# Patient Record
Sex: FEMALE | URBAN - METROPOLITAN AREA
[De-identification: names, ages, dates, MRNs, and addresses within clinical notes are randomized per-mention and may not be internally consistent; named-entity substitution may affect disease eponyms.]

---

## 2021-10-01 ENCOUNTER — APPOINTMENT (RX ONLY)
Dept: URBAN - METROPOLITAN AREA OTHER 13 | Facility: OTHER | Age: 76
Setting detail: DERMATOLOGY
End: 2021-10-01

## 2021-10-01 DIAGNOSIS — L21.8 OTHER SEBORRHEIC DERMATITIS: ICD-10-CM

## 2021-10-01 PROCEDURE — ? PRESCRIPTION MEDICATION MANAGEMENT

## 2021-10-01 PROCEDURE — ? PRESCRIPTION

## 2021-10-01 PROCEDURE — 99203 OFFICE O/P NEW LOW 30 MIN: CPT

## 2021-10-01 PROCEDURE — ? COUNSELING

## 2021-10-01 RX ORDER — KETOCONAZOLE 10 MG/ML
SHAMPOO TOPICAL
Qty: 200 | Refills: 2 | Status: ERX | COMMUNITY
Start: 2021-10-01

## 2021-10-01 RX ORDER — FLUOCINOLONE ACETONIDE 0.1 MG/ML
SOLUTION TOPICAL
Qty: 60 | Refills: 1 | Status: ERX | COMMUNITY
Start: 2021-10-01

## 2021-10-01 RX ORDER — HYDROCORTISONE 25 MG/G
CREAM TOPICAL
Qty: 30 | Refills: 2 | Status: ERX | COMMUNITY
Start: 2021-10-01

## 2021-10-01 RX ADMIN — FLUOCINOLONE ACETONIDE: 0.1 SOLUTION TOPICAL at 00:00

## 2021-10-01 RX ADMIN — HYDROCORTISONE: 25 CREAM TOPICAL at 00:00

## 2021-10-01 RX ADMIN — KETOCONAZOLE: 10 SHAMPOO TOPICAL at 00:00

## 2021-10-01 ASSESSMENT — LOCATION DETAILED DESCRIPTION DERM
LOCATION DETAILED: MEDIAL FRONTAL SCALP
LOCATION DETAILED: RIGHT SUPERIOR PARIETAL SCALP
LOCATION DETAILED: RIGHT INFERIOR LATERAL FOREHEAD
LOCATION DETAILED: LEFT INFERIOR LATERAL FOREHEAD

## 2021-10-01 ASSESSMENT — LOCATION ZONE DERM
LOCATION ZONE: SCALP
LOCATION ZONE: FACE

## 2021-10-01 ASSESSMENT — LOCATION SIMPLE DESCRIPTION DERM
LOCATION SIMPLE: RIGHT FOREHEAD
LOCATION SIMPLE: FRONTAL SCALP
LOCATION SIMPLE: LEFT FOREHEAD
LOCATION SIMPLE: SCALP

## 2021-10-01 NOTE — PROCEDURE: PRESCRIPTION MEDICATION MANAGEMENT
Render In Strict Bullet Format?: No
Continue Regimen: Using ketoconazole or clobetasol
Initiate Treatment: Nizoral shampoo for scalp \\nSynalar solution for scalp and eyebrows \\nHydrocortisone for eyelid rash
Detail Level: Zone

## 2022-03-18 ENCOUNTER — RX ONLY (OUTPATIENT)
Age: 77
Setting detail: RX ONLY
End: 2022-03-18

## 2022-03-18 ENCOUNTER — APPOINTMENT (RX ONLY)
Dept: URBAN - METROPOLITAN AREA OTHER 13 | Facility: OTHER | Age: 77
Setting detail: DERMATOLOGY
End: 2022-03-18

## 2022-03-18 DIAGNOSIS — L21.8 OTHER SEBORRHEIC DERMATITIS: ICD-10-CM | Status: INADEQUATELY CONTROLLED

## 2022-03-18 PROCEDURE — 99213 OFFICE O/P EST LOW 20 MIN: CPT

## 2022-03-18 PROCEDURE — ? TREATMENT REGIMEN

## 2022-03-18 PROCEDURE — ? COUNSELING

## 2022-03-18 RX ORDER — KETOCONAZOLE 10 MG/ML
SHAMPOO TOPICAL
Qty: 200 | Refills: 2 | Status: ERX

## 2022-03-18 RX ORDER — HYDROCORTISONE 25 MG/G
CREAM TOPICAL
Qty: 30 | Refills: 2 | Status: ERX

## 2022-03-18 ASSESSMENT — LOCATION DETAILED DESCRIPTION DERM
LOCATION DETAILED: LEFT INFERIOR MEDIAL FOREHEAD
LOCATION DETAILED: LEFT INFERIOR MEDIAL MALAR CHEEK
LOCATION DETAILED: RIGHT INFERIOR MEDIAL MALAR CHEEK

## 2022-03-18 ASSESSMENT — LOCATION SIMPLE DESCRIPTION DERM
LOCATION SIMPLE: RIGHT CHEEK
LOCATION SIMPLE: LEFT FOREHEAD
LOCATION SIMPLE: LEFT CHEEK

## 2022-03-18 ASSESSMENT — SEVERITY ASSESSMENT: HOW SEVERE IS THIS PATIENT'S CONDITION?: MODERATE

## 2022-03-18 ASSESSMENT — LOCATION ZONE DERM: LOCATION ZONE: FACE

## 2022-03-18 NOTE — PROCEDURE: TREATMENT REGIMEN
Detail Level: Zone
Plan: A brochure in [facility] was given and written instructions were given on how to use medication
Discontinue Regimen: Altreno on face until rash is better and then start back using it only 2 nights a week
Continue Regimen: Nizoral shampoo on scalp\\n\\nSynalar solution on scalp after showering
Initiate Treatment: Nizoral shampoo- wash face in the shower \\n\\nHydrocortisone cream- 2-3 times a day for a couple of weeks, prn flares

## 2023-12-06 ENCOUNTER — APPOINTMENT (RX ONLY)
Dept: URBAN - METROPOLITAN AREA OTHER 13 | Facility: OTHER | Age: 78
Setting detail: DERMATOLOGY
End: 2023-12-06

## 2023-12-06 DIAGNOSIS — L82.1 OTHER SEBORRHEIC KERATOSIS: ICD-10-CM

## 2023-12-06 DIAGNOSIS — B07.8 OTHER VIRAL WARTS: ICD-10-CM

## 2023-12-06 DIAGNOSIS — D22 MELANOCYTIC NEVI: ICD-10-CM

## 2023-12-06 DIAGNOSIS — L82.0 INFLAMED SEBORRHEIC KERATOSIS: ICD-10-CM

## 2023-12-06 DIAGNOSIS — Z85.828 PERSONAL HISTORY OF OTHER MALIGNANT NEOPLASM OF SKIN: ICD-10-CM

## 2023-12-06 DIAGNOSIS — D485 NEOPLASM OF UNCERTAIN BEHAVIOR OF SKIN: ICD-10-CM

## 2023-12-06 DIAGNOSIS — L21.8 OTHER SEBORRHEIC DERMATITIS: ICD-10-CM

## 2023-12-06 DIAGNOSIS — L73.8 OTHER SPECIFIED FOLLICULAR DISORDERS: ICD-10-CM

## 2023-12-06 PROBLEM — D48.5 NEOPLASM OF UNCERTAIN BEHAVIOR OF SKIN: Status: ACTIVE | Noted: 2023-12-06

## 2023-12-06 PROBLEM — D22.9 MELANOCYTIC NEVI, UNSPECIFIED: Status: ACTIVE | Noted: 2023-12-06

## 2023-12-06 PROCEDURE — ? PRESCRIPTION

## 2023-12-06 PROCEDURE — ? COUNSELING

## 2023-12-06 PROCEDURE — ? LIQUID NITROGEN

## 2023-12-06 PROCEDURE — ? TREATMENT REGIMEN

## 2023-12-06 PROCEDURE — 11102 TANGNTL BX SKIN SINGLE LES: CPT | Mod: 59

## 2023-12-06 PROCEDURE — 17110 DESTRUCTION B9 LES UP TO 14: CPT

## 2023-12-06 PROCEDURE — 99213 OFFICE O/P EST LOW 20 MIN: CPT | Mod: 25

## 2023-12-06 PROCEDURE — ? BIOPSY BY SHAVE METHOD

## 2023-12-06 PROCEDURE — ? PRESCRIPTION MEDICATION MANAGEMENT

## 2023-12-06 RX ORDER — HYDROCORTISONE 25 MG/ML
LOTION TOPICAL
Qty: 118 | Refills: 3 | Status: ERX | COMMUNITY
Start: 2023-12-06

## 2023-12-06 RX ORDER — KETOCONAZOLE 20 MG/ML
SHAMPOO, SUSPENSION TOPICAL
Qty: 120 | Refills: 3 | Status: ERX | COMMUNITY
Start: 2023-12-06

## 2023-12-06 RX ADMIN — KETOCONAZOLE: 20 SHAMPOO, SUSPENSION TOPICAL at 00:00

## 2023-12-06 RX ADMIN — HYDROCORTISONE: 25 LOTION TOPICAL at 00:00

## 2023-12-06 ASSESSMENT — LOCATION DETAILED DESCRIPTION DERM
LOCATION DETAILED: LEFT INFERIOR MEDIAL FOREHEAD
LOCATION DETAILED: RIGHT MEDIAL SUPERIOR CHEST
LOCATION DETAILED: RIGHT SUPERIOR MEDIAL UPPER BACK
LOCATION DETAILED: LEFT SUPERIOR MEDIAL UPPER BACK
LOCATION DETAILED: RIGHT PROXIMAL POSTERIOR UPPER ARM
LOCATION DETAILED: LEFT INFERIOR MEDIAL MALAR CHEEK
LOCATION DETAILED: RIGHT MEDIAL UPPER BACK
LOCATION DETAILED: LEFT CLAVICULAR SKIN
LOCATION DETAILED: RIGHT CENTRAL MALAR CHEEK
LOCATION DETAILED: LEFT CENTRAL MALAR CHEEK
LOCATION DETAILED: PERIUMBILICAL SKIN
LOCATION DETAILED: RIGHT INFERIOR MEDIAL MALAR CHEEK

## 2023-12-06 ASSESSMENT — LOCATION SIMPLE DESCRIPTION DERM
LOCATION SIMPLE: CHEST
LOCATION SIMPLE: RIGHT CHEEK
LOCATION SIMPLE: RIGHT POSTERIOR UPPER ARM
LOCATION SIMPLE: ABDOMEN
LOCATION SIMPLE: LEFT FOREHEAD
LOCATION SIMPLE: LEFT UPPER BACK
LOCATION SIMPLE: LEFT CHEEK
LOCATION SIMPLE: RIGHT UPPER BACK
LOCATION SIMPLE: LEFT CLAVICULAR SKIN

## 2023-12-06 ASSESSMENT — LOCATION ZONE DERM
LOCATION ZONE: TRUNK
LOCATION ZONE: FACE
LOCATION ZONE: ARM

## 2023-12-06 NOTE — PROCEDURE: PRESCRIPTION MEDICATION MANAGEMENT
Continue Regimen: ketoconazole 2 % shampoo \\nQuantity: 120.0 ml  Days Supply: 30\\nSig: Apply directly to scalp, leaving on a few minutes (5 minutes)prior to rinsing 3 times a week. Use 3-4 weeks then prn for flares.\\n\\nhydrocortisone 2.5 % lotion \\nQuantity: 118.0 ml  Days Supply: 30\\nSig: Apply to effected area bid x 2 weeks then stop for two weeks. May repeat prn
Detail Level: Zone
Render In Strict Bullet Format?: No

## 2023-12-06 NOTE — PROCEDURE: LIQUID NITROGEN
Render Note In Bullet Format When Appropriate: No
Show Aperture Variable?: Yes
Medical Necessity Clause: This procedure was medically necessary because the lesions that were treated were:
Spray Paint Text: The liquid nitrogen was applied to the skin utilizing a spray paint frosting technique.
Post-Care Instructions: I reviewed with the patient in detail post-care instructions. Patient is to wear sunprotection, and avoid picking at any of the treated lesions. Pt may apply Vaseline to crusted or scabbing areas.
Detail Level: Detailed
Medical Necessity Information: It is in your best interest to select a reason for this procedure from the list below. All of these items fulfill various CMS LCD requirements except the new and changing color options.
Consent: The patient's consent was obtained including but not limited to risks of crusting, scabbing, blistering, scarring, darker or lighter pigmentary change, recurrence, incomplete removal and infection.

## 2023-12-06 NOTE — PROCEDURE: MIPS QUALITY
Quality 431: Preventive Care And Screening: Unhealthy Alcohol Use - Screening: Patient identified as an unhealthy alcohol user when screened for unhealthy alcohol use using a systematic screening method and received brief counseling
Quality 110: Preventive Care And Screening: Influenza Immunization: Influenza Immunization Administered during Influenza season
Quality 47: Advance Care Plan: Advance Care Planning discussed and documented; advance care plan or surrogate decision maker documented in the medical record.
Detail Level: Detailed
Quality 226: Preventive Care And Screening: Tobacco Use: Screening And Cessation Intervention: Patient screened for tobacco use, is a smoker AND received Cessation Counseling within measurement period or in the six months prior to the measurement period
Quality 111:Pneumonia Vaccination Status For Older Adults: Patient received any pneumococcal conjugate or polysaccharide vaccine on or after their 60th birthday and before the end of the measurement period

## 2023-12-06 NOTE — PROCEDURE: BIOPSY BY SHAVE METHOD
Detail Level: Detailed
Depth Of Biopsy: dermis
Was A Bandage Applied: Yes
Size Of Lesion In Cm: 0
Biopsy Type: H and E
Biopsy Method: Dermablade
Anesthesia Type: 1% lidocaine with epinephrine
Anesthesia Volume In Cc: 0.5
Hemostasis: Drysol
Wound Care: Petrolatum
Dressing: bandage
Destruction After The Procedure: No
Type Of Destruction Used: Curettage
Curettage Text: The wound bed was treated with curettage after the biopsy was performed.
Cryotherapy Text: The wound bed was treated with cryotherapy after the biopsy was performed.
Electrodesiccation Text: The wound bed was treated with electrodesiccation after the biopsy was performed.
Electrodesiccation And Curettage Text: The wound bed was treated with electrodesiccation and curettage after the biopsy was performed.
Silver Nitrate Text: The wound bed was treated with silver nitrate after the biopsy was performed.
Lab: [id number]
Consent: Written consent was obtained and risks were reviewed including but not limited to scarring, infection, bleeding, scabbing, incomplete removal, nerve damage and allergy to anesthesia.
Post-Care Instructions: I reviewed with the patient in detail post-care instructions. Patient is to keep the biopsy site dry overnight, and then apply bacitracin twice daily until healed. Patient may apply hydrogen peroxide soaks to remove any crusting.
Notification Instructions: Patient will be notified of biopsy results. However, patient instructed to call the office if not contacted within 2 weeks.
Billing Type: Third-Party Bill
Information: Selecting Yes will display possible errors in your note based on the variables you have selected. This validation is only offered as a suggestion for you. PLEASE NOTE THAT THE VALIDATION TEXT WILL BE REMOVED WHEN YOU FINALIZE YOUR NOTE. IF YOU WANT TO FAX A PRELIMINARY NOTE YOU WILL NEED TO TOGGLE THIS TO 'NO' IF YOU DO NOT WANT IT IN YOUR FAXED NOTE.

## 2024-03-19 ENCOUNTER — APPOINTMENT (RX ONLY)
Dept: URBAN - METROPOLITAN AREA OTHER 13 | Facility: OTHER | Age: 79
Setting detail: DERMATOLOGY
End: 2024-03-19

## 2024-03-19 DIAGNOSIS — D18.0 HEMANGIOMA: ICD-10-CM

## 2024-03-19 DIAGNOSIS — L82.1 OTHER SEBORRHEIC KERATOSIS: ICD-10-CM

## 2024-03-19 DIAGNOSIS — Z87.2 PERSONAL HISTORY OF DISEASES OF THE SKIN AND SUBCUTANEOUS TISSUE: ICD-10-CM

## 2024-03-19 PROBLEM — D18.01 HEMANGIOMA OF SKIN AND SUBCUTANEOUS TISSUE: Status: ACTIVE | Noted: 2024-03-19

## 2024-03-19 PROCEDURE — ? COUNSELING

## 2024-03-19 PROCEDURE — 99213 OFFICE O/P EST LOW 20 MIN: CPT

## 2024-03-19 ASSESSMENT — LOCATION DETAILED DESCRIPTION DERM
LOCATION DETAILED: RIGHT SUPERIOR MEDIAL UPPER BACK
LOCATION DETAILED: LEFT MEDIAL UPPER BACK
LOCATION DETAILED: RIGHT SUPERIOR UPPER BACK

## 2024-03-19 ASSESSMENT — LOCATION SIMPLE DESCRIPTION DERM
LOCATION SIMPLE: LEFT UPPER BACK
LOCATION SIMPLE: RIGHT UPPER BACK

## 2024-03-19 ASSESSMENT — LOCATION ZONE DERM: LOCATION ZONE: TRUNK

## 2024-12-20 ENCOUNTER — APPOINTMENT (OUTPATIENT)
Dept: URBAN - METROPOLITAN AREA OTHER 13 | Facility: OTHER | Age: 79
Setting detail: DERMATOLOGY
End: 2024-12-20

## 2024-12-20 DIAGNOSIS — D22 MELANOCYTIC NEVI: ICD-10-CM

## 2024-12-20 DIAGNOSIS — L82.1 OTHER SEBORRHEIC KERATOSIS: ICD-10-CM

## 2024-12-20 DIAGNOSIS — Z85.828 PERSONAL HISTORY OF OTHER MALIGNANT NEOPLASM OF SKIN: ICD-10-CM

## 2024-12-20 DIAGNOSIS — L21.8 OTHER SEBORRHEIC DERMATITIS: ICD-10-CM

## 2024-12-20 DIAGNOSIS — L73.8 OTHER SPECIFIED FOLLICULAR DISORDERS: ICD-10-CM

## 2024-12-20 DIAGNOSIS — Z87.2 PERSONAL HISTORY OF DISEASES OF THE SKIN AND SUBCUTANEOUS TISSUE: ICD-10-CM

## 2024-12-20 DIAGNOSIS — D18.0 HEMANGIOMA: ICD-10-CM

## 2024-12-20 DIAGNOSIS — L82.0 INFLAMED SEBORRHEIC KERATOSIS: ICD-10-CM

## 2024-12-20 DIAGNOSIS — L72.0 EPIDERMAL CYST: ICD-10-CM

## 2024-12-20 PROBLEM — D22.9 MELANOCYTIC NEVI, UNSPECIFIED: Status: ACTIVE | Noted: 2024-12-20

## 2024-12-20 PROBLEM — D18.01 HEMANGIOMA OF SKIN AND SUBCUTANEOUS TISSUE: Status: ACTIVE | Noted: 2024-12-20

## 2024-12-20 PROCEDURE — ? TREATMENT REGIMEN

## 2024-12-20 PROCEDURE — ? LIQUID NITROGEN

## 2024-12-20 PROCEDURE — ? COUNSELING

## 2024-12-20 PROCEDURE — ? PRESCRIPTION MEDICATION MANAGEMENT

## 2024-12-20 PROCEDURE — ? PRESCRIPTION

## 2024-12-20 PROCEDURE — 17110 DESTRUCTION B9 LES UP TO 14: CPT

## 2024-12-20 PROCEDURE — 99213 OFFICE O/P EST LOW 20 MIN: CPT | Mod: 25

## 2024-12-20 RX ORDER — KETOCONAZOLE 20 MG/ML
SHAMPOO, SUSPENSION TOPICAL
Qty: 120 | Refills: 3 | Status: ERX

## 2024-12-20 RX ORDER — CLOBETASOL PROPIONATE 0.5 MG/ML
SOLUTION TOPICAL
Qty: 50 | Refills: 5 | Status: ERX | COMMUNITY
Start: 2024-12-20

## 2024-12-20 RX ORDER — HYDROCORTISONE 25 MG/ML
LOTION TOPICAL
Qty: 118 | Refills: 3 | Status: ERX

## 2024-12-20 RX ADMIN — CLOBETASOL PROPIONATE: 0.5 SOLUTION TOPICAL at 00:00

## 2024-12-20 ASSESSMENT — LOCATION DETAILED DESCRIPTION DERM
LOCATION DETAILED: LEFT SUPERIOR MEDIAL UPPER BACK
LOCATION DETAILED: RIGHT DISTAL PRETIBIAL REGION
LOCATION DETAILED: LEFT CENTRAL MALAR CHEEK
LOCATION DETAILED: RIGHT MEDIAL FOREHEAD
LOCATION DETAILED: RIGHT CENTRAL MALAR CHEEK
LOCATION DETAILED: RIGHT MEDIAL SUPERIOR CHEST
LOCATION DETAILED: LEFT INFERIOR MEDIAL MALAR CHEEK
LOCATION DETAILED: RIGHT RIB CAGE
LOCATION DETAILED: LEFT INFERIOR MEDIAL FOREHEAD
LOCATION DETAILED: RIGHT SUPERIOR MEDIAL UPPER BACK
LOCATION DETAILED: RIGHT MEDIAL UPPER BACK
LOCATION DETAILED: RIGHT INFERIOR MEDIAL MALAR CHEEK
LOCATION DETAILED: RIGHT MID-UPPER BACK
LOCATION DETAILED: PERIUMBILICAL SKIN

## 2024-12-20 ASSESSMENT — LOCATION SIMPLE DESCRIPTION DERM
LOCATION SIMPLE: RIGHT CHEEK
LOCATION SIMPLE: LEFT CHEEK
LOCATION SIMPLE: RIGHT FOREHEAD
LOCATION SIMPLE: RIGHT PRETIBIAL REGION
LOCATION SIMPLE: LEFT FOREHEAD
LOCATION SIMPLE: ABDOMEN
LOCATION SIMPLE: CHEST
LOCATION SIMPLE: RIGHT UPPER BACK
LOCATION SIMPLE: LEFT UPPER BACK

## 2024-12-20 ASSESSMENT — LOCATION ZONE DERM
LOCATION ZONE: TRUNK
LOCATION ZONE: FACE
LOCATION ZONE: LEG

## 2024-12-20 NOTE — PROCEDURE: TREATMENT REGIMEN
Detail Level: Zone
Continue Regimen: Ketoconazole shampoo\\nHydrocortisone cream- 2-3 times a day for a couple of weeks, prn flares

## 2024-12-20 NOTE — PROCEDURE: LIQUID NITROGEN
Consent: The patient's consent was obtained including but not limited to risks of crusting, scabbing, blistering, scarring, darker or lighter pigmentary change, recurrence, incomplete removal and infection.
Medical Necessity Information: It is in your best interest to select a reason for this procedure from the list below. All of these items fulfill various CMS LCD requirements except the new and changing color options.
Post-Care Instructions: I reviewed with the patient in detail post-care instructions. Patient is to wear sunprotection, and avoid picking at any of the treated lesions. Pt may apply Vaseline to crusted or scabbing areas.
Show Topical Anesthesia Variable?: Yes
Detail Level: Detailed
Render Post-Care Instructions In Note?: no
Medical Necessity Clause: This procedure was medically necessary because the lesions that were treated were:
Spray Paint Text: The liquid nitrogen was applied to the skin utilizing a spray paint frosting technique.

## 2025-05-16 ENCOUNTER — APPOINTMENT (OUTPATIENT)
Dept: URBAN - METROPOLITAN AREA OTHER 13 | Facility: OTHER | Age: 80
Setting detail: DERMATOLOGY
End: 2025-05-16

## 2025-05-16 DIAGNOSIS — L72.0 EPIDERMAL CYST: ICD-10-CM

## 2025-05-16 DIAGNOSIS — L08.9 LOCAL INFECTION OF THE SKIN AND SUBCUTANEOUS TISSUE, UNSPECIFIED: ICD-10-CM

## 2025-05-16 DIAGNOSIS — L0293 CARBUNCLE AND FURUNCLE OF UNSPECIFIED SITE: ICD-10-CM

## 2025-05-16 DIAGNOSIS — L0292 CARBUNCLE AND FURUNCLE OF UNSPECIFIED SITE: ICD-10-CM

## 2025-05-16 PROBLEM — L02.12 FURUNCLE OF NECK: Status: ACTIVE | Noted: 2025-05-16

## 2025-05-16 PROCEDURE — ? PRESCRIPTION MEDICATION MANAGEMENT

## 2025-05-16 PROCEDURE — 10060 I&D ABSCESS SIMPLE/SINGLE: CPT

## 2025-05-16 PROCEDURE — 99213 OFFICE O/P EST LOW 20 MIN: CPT | Mod: 25

## 2025-05-16 PROCEDURE — ? INCISION AND DRAINAGE

## 2025-05-16 PROCEDURE — ? PRESCRIPTION

## 2025-05-16 PROCEDURE — ? ORDER TESTS

## 2025-05-16 RX ORDER — MUPIROCIN 20 MG/G
OINTMENT TOPICAL
Qty: 22 | Refills: 0 | Status: ERX | COMMUNITY
Start: 2025-05-16

## 2025-05-16 RX ORDER — DOXYCYCLINE HYCLATE 100 MG/1
CAPSULE, GELATIN COATED ORAL
Qty: 20 | Refills: 0 | Status: ERX | COMMUNITY
Start: 2025-05-16

## 2025-05-16 RX ADMIN — MUPIROCIN: 20 OINTMENT TOPICAL at 00:00

## 2025-05-16 RX ADMIN — DOXYCYCLINE HYCLATE: 100 CAPSULE, GELATIN COATED ORAL at 00:00

## 2025-05-16 ASSESSMENT — LOCATION ZONE DERM
LOCATION ZONE: NECK
LOCATION ZONE: FACE
LOCATION ZONE: ARM

## 2025-05-16 ASSESSMENT — LOCATION DETAILED DESCRIPTION DERM
LOCATION DETAILED: RIGHT ANTERIOR SHOULDER
LOCATION DETAILED: RIGHT CLAVICULAR NECK
LOCATION DETAILED: INFERIOR MID FOREHEAD

## 2025-05-16 ASSESSMENT — LOCATION SIMPLE DESCRIPTION DERM
LOCATION SIMPLE: RIGHT ANTERIOR NECK
LOCATION SIMPLE: RIGHT SHOULDER
LOCATION SIMPLE: INFERIOR FOREHEAD

## 2025-05-23 ENCOUNTER — APPOINTMENT (OUTPATIENT)
Dept: URBAN - METROPOLITAN AREA OTHER 13 | Facility: OTHER | Age: 80
Setting detail: DERMATOLOGY
End: 2025-05-23

## 2025-05-23 DIAGNOSIS — S0182XA OPEN WOUND OF OTHER AND MULTIPLE SITES OF FACE, COMPLICATED: ICD-10-CM | Status: RESOLVING

## 2025-05-23 PROBLEM — S41.001A UNSPECIFIED OPEN WOUND OF RIGHT SHOULDER, INITIAL ENCOUNTER: Status: ACTIVE | Noted: 2025-05-23

## 2025-05-23 PROCEDURE — ? COUNSELING

## 2025-05-23 PROCEDURE — ? PRESCRIPTION MEDICATION MANAGEMENT

## 2025-05-23 ASSESSMENT — LOCATION DETAILED DESCRIPTION DERM: LOCATION DETAILED: RIGHT ANTERIOR SHOULDER

## 2025-05-23 ASSESSMENT — LOCATION SIMPLE DESCRIPTION DERM: LOCATION SIMPLE: RIGHT SHOULDER

## 2025-05-23 ASSESSMENT — LOCATION ZONE DERM: LOCATION ZONE: ARM

## 2025-05-23 NOTE — PROCEDURE: MIPS QUALITY
Quality 431: Preventive Care And Screening: Unhealthy Alcohol Use - Screening: Patient not identified as an unhealthy alcohol user when screened for unhealthy alcohol use using a systematic screening method
Quality 226: Preventive Care And Screening: Tobacco Use: Screening And Cessation Intervention: Patient screened for tobacco use and is an ex/non-smoker
Quality 130: Documentation Of Current Medications In The Medical Record: Documentation of a medical reason(s) for not documenting, updating, or reviewing the patient’s current medications list (e.g., patient is in an urgent or emergent medical situation)
Quality 47: Advance Care Plan: Advance Care Planning discussed and documented in the medical record; patient did not wish or was not able to name a surrogate decision maker or provide an advance care plan.
Detail Level: Detailed